# Patient Record
Sex: MALE | Race: WHITE
[De-identification: names, ages, dates, MRNs, and addresses within clinical notes are randomized per-mention and may not be internally consistent; named-entity substitution may affect disease eponyms.]

---

## 2020-05-05 ENCOUNTER — HOSPITAL ENCOUNTER (OUTPATIENT)
Dept: HOSPITAL 92 - LABBT | Age: 55
Discharge: HOME | End: 2020-05-05
Attending: NEUROLOGICAL SURGERY
Payer: COMMERCIAL

## 2020-05-05 DIAGNOSIS — Z11.59: ICD-10-CM

## 2020-05-05 DIAGNOSIS — Z01.818: Primary | ICD-10-CM

## 2020-05-05 DIAGNOSIS — M51.16: ICD-10-CM

## 2020-05-05 PROCEDURE — 93010 ELECTROCARDIOGRAM REPORT: CPT

## 2020-05-05 PROCEDURE — 93005 ELECTROCARDIOGRAM TRACING: CPT

## 2020-05-05 PROCEDURE — U0003 INFECTIOUS AGENT DETECTION BY NUCLEIC ACID (DNA OR RNA); SEVERE ACUTE RESPIRATORY SYNDROME CORONAVIRUS 2 (SARS-COV-2) (CORONAVIRUS DISEASE [COVID-19]), AMPLIFIED PROBE TECHNIQUE, MAKING USE OF HIGH THROUGHPUT TECHNOLOGIES AS DESCRIBED BY CMS-2020-01-R: HCPCS

## 2020-05-05 PROCEDURE — 87635 SARS-COV-2 COVID-19 AMP PRB: CPT

## 2020-05-08 ENCOUNTER — HOSPITAL ENCOUNTER (OUTPATIENT)
Dept: HOSPITAL 92 - SDC | Age: 55
Discharge: HOME | End: 2020-05-08
Attending: NEUROLOGICAL SURGERY
Payer: COMMERCIAL

## 2020-05-08 VITALS — BODY MASS INDEX: 31.8 KG/M2

## 2020-05-08 DIAGNOSIS — Z79.891: ICD-10-CM

## 2020-05-08 DIAGNOSIS — I10: ICD-10-CM

## 2020-05-08 DIAGNOSIS — M51.16: Primary | ICD-10-CM

## 2020-05-08 DIAGNOSIS — Z79.899: ICD-10-CM

## 2020-05-08 DIAGNOSIS — F17.200: ICD-10-CM

## 2020-05-08 LAB
ANION GAP SERPL CALC-SCNC: 14 MMOL/L (ref 10–20)
BASOPHILS # BLD AUTO: 0.1 THOU/UL (ref 0–0.2)
BASOPHILS NFR BLD AUTO: 0.8 % (ref 0–1)
BUN SERPL-MCNC: 13 MG/DL (ref 8.4–25.7)
CALCIUM SERPL-MCNC: 8.6 MG/DL (ref 7.8–10.44)
CHLORIDE SERPL-SCNC: 105 MMOL/L (ref 98–107)
CO2 SERPL-SCNC: 24 MMOL/L (ref 22–29)
CREAT CL PREDICTED SERPL C-G-VRATE: 110 ML/MIN (ref 70–130)
EOSINOPHIL # BLD AUTO: 0.5 THOU/UL (ref 0–0.7)
EOSINOPHIL NFR BLD AUTO: 6.5 % (ref 0–10)
GLUCOSE SERPL-MCNC: 92 MG/DL (ref 70–105)
HGB BLD-MCNC: 13.8 G/DL (ref 14–18)
LYMPHOCYTES # BLD: 2.6 THOU/UL (ref 1.2–3.4)
LYMPHOCYTES NFR BLD AUTO: 33 % (ref 21–51)
MCH RBC QN AUTO: 31 PG (ref 27–31)
MCV RBC AUTO: 97.4 FL (ref 78–98)
MONOCYTES # BLD AUTO: 0.9 THOU/UL (ref 0.11–0.59)
MONOCYTES NFR BLD AUTO: 11.5 % (ref 0–10)
NEUTROPHILS # BLD AUTO: 3.8 THOU/UL (ref 1.4–6.5)
NEUTROPHILS NFR BLD AUTO: 48.1 % (ref 42–75)
PLATELET # BLD AUTO: 191 THOU/UL (ref 130–400)
POTASSIUM SERPL-SCNC: 4.3 MMOL/L (ref 3.5–5.1)
RBC # BLD AUTO: 4.44 MILL/UL (ref 4.7–6.1)
SODIUM SERPL-SCNC: 139 MMOL/L (ref 136–145)
WBC # BLD AUTO: 7.8 THOU/UL (ref 4.8–10.8)

## 2020-05-08 PROCEDURE — 36415 COLL VENOUS BLD VENIPUNCTURE: CPT

## 2020-05-08 PROCEDURE — S0020 INJECTION, BUPIVICAINE HYDRO: HCPCS

## 2020-05-08 PROCEDURE — 01NB0ZZ RELEASE LUMBAR NERVE, OPEN APPROACH: ICD-10-PCS | Performed by: NEUROLOGICAL SURGERY

## 2020-05-08 PROCEDURE — 76000 FLUOROSCOPY <1 HR PHYS/QHP: CPT

## 2020-05-08 PROCEDURE — 80048 BASIC METABOLIC PNL TOTAL CA: CPT

## 2020-05-08 PROCEDURE — 85025 COMPLETE CBC W/AUTO DIFF WBC: CPT

## 2020-05-08 PROCEDURE — 0SB20ZZ EXCISION OF LUMBAR VERTEBRAL DISC, OPEN APPROACH: ICD-10-PCS | Performed by: NEUROLOGICAL SURGERY

## 2020-05-08 NOTE — OP
DATE OF PROCEDURE:  05/08/2020



FIRST ASSISTANT:  Bo Dhaliwal PA-C



INDICATION:  Pain.



DIAGNOSIS:  Lumbar radiculopathy.



PROCEDURE PERFORMED:  Left L4 diskectomy.



ANESTHESIA:  General.



DESCRIPTION OF PROCEDURE:  The patient was brought into the operating room and

placed under general anesthesia.  He was flipped from the supine to prone position

on the operating room table.  A linear incision was planned over the L4 segment.

After prepping and draping and after an appropriate operative pause, the incision

was created.  The soft tissues were swept left of the midline.  Self-retaining

retractors were placed in the wound for optimal exposure.  After confirming the

appropriate level with C-arm fluoroscopy, high-speed cutting drill bit as well as

2-, 3- and 4-mm Kerrisons were used to perform a laminectomy along the inferior

aspect of L4 all the way up to the level of pedicle.  The extruded disk fragment was

identified adjacent to the exiting nerve root at this level and was carefully

removed in one large fragment.  The wound was then irrigated.  Hemostasis was

maintained throughout.  The wound was then closed in anatomic layers and a pressure

dressing was applied.  There were no known procedural complications. 







Job ID:  [id number]

## 2020-05-10 NOTE — EKG
Test Reason : 

Blood Pressure : ***/*** mmHG

Vent. Rate : 071 BPM     Atrial Rate : 071 BPM

   P-R Int : 160 ms          QRS Dur : 088 ms

    QT Int : 404 ms       P-R-T Axes : 069 -14 068 degrees

   QTc Int : 439 ms

 

Normal sinus rhythm

Normal ECG

No previous ECGs available

Confirmed by AIDEE GROSS (2) on 5/10/2020 3:57:40 PM

 

Referred By:  DIANN           Confirmed By:AIDEE GROSS